# Patient Record
Sex: MALE | Race: WHITE | NOT HISPANIC OR LATINO | Employment: UNEMPLOYED | URBAN - METROPOLITAN AREA
[De-identification: names, ages, dates, MRNs, and addresses within clinical notes are randomized per-mention and may not be internally consistent; named-entity substitution may affect disease eponyms.]

---

## 2023-07-30 ENCOUNTER — HOSPITAL ENCOUNTER (EMERGENCY)
Facility: CLINIC | Age: 23
Discharge: HOME | End: 2023-07-31
Attending: EMERGENCY MEDICINE | Admitting: EMERGENCY MEDICINE
Payer: COMMERCIAL

## 2023-07-30 VITALS
BODY MASS INDEX: 24.38 KG/M2 | WEIGHT: 190 LBS | HEIGHT: 74 IN | RESPIRATION RATE: 16 BRPM | SYSTOLIC BLOOD PRESSURE: 136 MMHG | OXYGEN SATURATION: 98 % | HEART RATE: 86 BPM | TEMPERATURE: 98.7 F | DIASTOLIC BLOOD PRESSURE: 88 MMHG

## 2023-07-30 DIAGNOSIS — R45.851 SUICIDAL IDEATION: ICD-10-CM

## 2023-07-30 DIAGNOSIS — F10.920 ALCOHOLIC INTOXICATION WITHOUT COMPLICATION (H): ICD-10-CM

## 2023-07-30 PROCEDURE — 99285 EMERGENCY DEPT VISIT HI MDM: CPT | Mod: 25

## 2023-07-30 ASSESSMENT — ACTIVITIES OF DAILY LIVING (ADL): ADLS_ACUITY_SCORE: 35

## 2023-07-31 PROBLEM — F41.9 ANXIETY: Status: ACTIVE | Noted: 2023-07-31

## 2023-07-31 PROBLEM — R45.851 SUICIDAL IDEATION: Status: ACTIVE | Noted: 2023-07-31

## 2023-07-31 PROBLEM — F33.1 MAJOR DEPRESSIVE DISORDER, RECURRENT EPISODE, MODERATE (H): Status: ACTIVE | Noted: 2023-07-31

## 2023-07-31 PROBLEM — F10.920 ALCOHOL INTOXICATION, UNCOMPLICATED (H): Status: ACTIVE | Noted: 2023-07-31

## 2023-07-31 LAB — ALCOHOL BREATH TEST: 0.08 (ref 0–0.01)

## 2023-07-31 PROCEDURE — 90791 PSYCH DIAGNOSTIC EVALUATION: CPT

## 2023-07-31 ASSESSMENT — ACTIVITIES OF DAILY LIVING (ADL)
ADLS_ACUITY_SCORE: 35

## 2023-07-31 NOTE — ED PROVIDER NOTES
"  History     Chief Complaint:  Alcohol Intoxication and Suicidal       HPI   Gael Ramirez is a 23 year old male who presents with alcohol intoxication and suicidal ideation. The patient presents from the bar at [facility] after his flight home to [place] was cancelled. He explains that he drank too much and told police at the bar that he wanted to kill himself. He notes a history of depression and suicidal ideation without a plan for the last 2 to 3 years. The patient states that he has never tried to hurt himself because he does not want to disappoint or worry his mother and others who care about him. Gael has no therapist and has not taken Lexapro for an extended period of time. The patient admits to daily marijuana use.     Independent Historian:   None - Patient Only    Review of External Notes:   None       Medications:    The patient is not currently taking any prescribed medications.      Past Medical History:    Depression    Physical Exam   Patient Vitals for the past 24 hrs:   BP Temp Temp src Pulse Resp SpO2 Height Weight   07/30/23 2253 136/88 98.7  F (37.1  C) Temporal 86 16 98 % 1.88 m (6' 2\") 86.2 kg (190 lb)        Physical Exam  General: Sitting up in bed  Eyes:  The pupils are equal and round    Conjunctivae and sclerae are normal  ENT:    Atraumatic face  Neck:  Normal range of motion  CV:  Regular rate     Skin warm and well perfused   Resp:  Non labored breathing on room air    No tachypnea    No cough heard  MS:  Normal muscular tone  Skin:  No rash or acute skin lesions noted  Neuro:   Awake, alert.      Speech is normal and fluent.    Face is symmetric.     Moves all extremities equally  Psych: Normal affect.  Appropriate interactions.    Emergency Department Course       Emergency Department Course & Assessments:    Assessments:  2309 I obtained history and examined the patient as noted above    Independent Interpretation (X-rays, CTs, rhythm " strip):  None    Consultations/Discussion of Management or Tests:  None        Social Determinants of Health affecting care:   None    Disposition:  The patient was discharged to home.     Impression & Plan      Medical Decision Making:  Gael Ramirez is a 23-year-old male who presented to the emergency department with alcohol intoxication and suicidal ideation.  He reports that he has a history of becoming intoxicated and then having suicidal ideation.  This is not the first time he has had suicidal ideation but he reports he would not harm himself and has not harmed himself in the past because of people that love him.  He is hoping to work remotely on his computer this morning and is forward thinking.  He started drinking because he became upset when his flight was canceled for the third time today.  Patient remained to the emergency department until clinically sober and then evaluated by DEC.  DEC obtained collateral information by family and recommends discharge home.    Diagnosis:    ICD-10-CM    1. Alcoholic intoxication without complication (H)  F10.920       2. Suicidal ideation  R45.851          Scribe Disclosure:  I, Yossi Baca, am serving as a scribe at 10:56 PM on 7/30/2023 to document services personally performed by Yola Noel MD based on my observations and the provider's statements to me.   7/30/2023   Yola Noel MD Goertz, Maria Kristine, MD  07/31/23 0607

## 2023-07-31 NOTE — ED TRIAGE NOTES
Patient arrived via ems from airport c/o alcohol intoxication and S.I. Patient lives in [place]. Patient is upset because his flight was cancelled. Patient called PD states he is drunk and some suicidal comments. BG is 90 per ems.     Triage Assessment       Row Name 07/30/23 8022       Triage Assessment (Adult)    Airway WDL WDL       Respiratory WDL    Respiratory WDL WDL       Skin Circulation/Temperature WDL    Skin Circulation/Temperature WDL WDL       Cardiac WDL    Cardiac WDL WDL       Peripheral/Neurovascular WDL    Peripheral Neurovascular WDL WDL       Cognitive/Neuro/Behavioral WDL    Cognitive/Neuro/Behavioral WDL X;level of consciousness

## 2023-07-31 NOTE — CONSULTS
"Diagnostic Evaluation Consultation  Crisis Assessment    Patient Name: Gael Ramirez  Age:  23 year old  Legal Sex: male  Gender Identity: male  Pronouns:   Race: White  Ethnicity: Not  or   Language: English      Patient was assessed: In person      Patient location: [facility] EMERGENCY DEPT                             BH3    Referral Data and Chief Complaint  Gael Ramirez presents to the ED via EMS. Patient is presenting to the ED for the following concerns: Intoxication, Suicidal ideation.   Factors that make the mental health crisis life threatening or complex are:  The pt arrived via EMS / PD after calling 911 at the [facility] intoxicated, stating he was suicidal. Pt reported he had an extremely stressful weekend flying from [place] to [place] for his mother's wedding, complications w/ a rental car, and his returning flight getting rescheduled three times before it was rescheduled for today at 5AM. Pt reported he \"had a drink to celebrate his mom's wedding\" and reported he \"had some more\" after finding out about the cancelation of the flight. Pt reported he \"had a full mental breakdown\" and called his mom, close friend, and 911 stating he had too much to drink, lost his suitcase, and was suicidal. Pt denied SI / HI at this time. Noted that writer attempted to meet w/ pt initially at around 2AM, however pt was confused and likely still intoxicated. Collateral was obtained and assessment was fully completed at 5:30AM.    Informed Consent and Assessment Methods  Explained the crisis assessment process, including applicable information disclosures and limits to confidentiality, assessed understanding of the process, and obtained consent to proceed with the assessment.  Assessment methods included conducting a formal interview with patient, review of medical records, collaboration with medical staff, and obtaining relevant collateral information from family and community " "providers when available.  : done     Patient response to interventions: eager to participate, acceptance expressed  Coping skills were attempted to reduce the crisis:  Called 911 in crisis     History of the Crisis   Pt reported he has had passive suicidal thoughts since the age of 15, and he has just \"dealt with them\". Pt reported he has never attempted suicide before and reported feeling he never will.    Brief Psychosocial History  Family:  Single, Children no  Support System:  Sibling(s), Parent(s)  Employment Status:  employed full-time (Works for [email or web address])  Source of Income:  salary/wages  Financial Environmental Concerns:  No concerns identified  Current Hobbies:  exercise/fitness, social media/computer activities, outdoor activities  Barriers in Personal Life:       Significant Clinical History  Current Anxiety Symptoms:     Current Depression/Trauma:  excessive guilt, withdrawl/isolation, low self esteem  Current Somatic Symptoms:  somatic symptoms (abdominal pain, headache, tension)  Current Psychosis/Thought Disturbance:     Current Eating Symptoms:     Chemical Use History:  Alcohol: Social (Reported he rarely blacks out from alcohol, however reported feeling he blacked out yesterday)  Benzodiazepines: None  Opiates: None  Cocaine: None  Marijuana: Occasional, Daily  Other Use: None   Past diagnosis:  Depression, Anxiety Disorder  Family history:     Past treatment:  Individual therapy, Primary Care, Psychiatric Medication Management  Details of most recent treatment:  Pt reported he sought out help for mental health 3 years ago. He reported he tried therapy through [email or web address], however they went out of business, so he tried an in-person therapist when living in [place] last year, however reported that provider cancelled on him 3x. Pt reported he last saw a medication provider 3 years ago and tried two medications of which he did not like. Pt reported he sees a PCP.  Other relevant history:       Collateral " "Information  Is there collateral information: Yes     Collateral information name, relationship, phone number:  Mother, Heike: [phone]    What happened today: \"He's had a rough last few days. He was supposed to get a rental car upon landing in [facility] [facility] from [place], and they didn't have any, so he waited, and had to spend $750 for one day on one last-minute. Then the wedding happened, and he got there 5 minutes before it started because of all that, and things went great and he went to the airport today, and his flight got delayed three times before it got rescheduled for tomorrow. He had a few drinks here and there, and next thing you know, he drank too much, called me intoxicated and said he was lost at the airport and panicking because he lost his luggage too. He's always struggled with depression and anxiety. I think this may have been one thing he experienced where it was all at once\".     What is different about patient's functioning: \"Doesn't usually drink very much or at all, more stressed / anxious because of the weekend\".     Concern about alcohol/drug use: no    What do you think the patient needs:      Has patient made comments about wanting to kill themselves/others: no    If d/c is recommended, can they take part in safety/aftercare planning:  yes    Additional collateral information:  I will be at a hotel nearby and you can call me when he's ready to discharge: [phone].     Risk Assessment  [place] Suicide Severity Rating Scale Full Clinical Version:  Suicidal Ideation  Q1 Wish to be Dead (Lifetime): Yes  Q2 Non-Specific Active Suicidal Thoughts (Lifetime): Yes  3. Active Suicidal Ideation with any Methods (Not Plan) Without Intent to Act (Lifetime): Yes  Q4 Active Suicidal Ideation with Some Intent to Act, Without Specific Plan (Lifetime): Yes  Q5 Active Suicidal Ideation with Specific Plan and Intent (Lifetime): Yes  Q6 Suicide Behavior (Lifetime): no     Suicidal Behavior (Lifetime)  Actual " Attempt (Lifetime): No  Has subject engaged in non-suicidal self-injurious behavior? (Lifetime): No  Interrupted Attempts (Lifetime): No  Aborted or Self-Interrupted Attempt (Lifetime): No  Preparatory Acts or Behavior (Lifetime): No    [place] Suicide Severity Rating Scale Recent:   Suicidal Ideation (Recent)  Q1 Wished to be Dead (Past Month): yes  Q2 Suicidal Thoughts (Past Month): yes  Q3 Suicidal Thought Method: yes  Q4 Suicidal Intent without Specific Plan: yes  Q5 Suicide Intent with Specific Plan: yes  Level of Risk per Screen: high risk  Intensity of Ideation (Recent)  Most Severe Ideation Rating (Past 1 Month): 3  Description of Most Severe Ideation (Past 1 Month): Thoughts of jumping off bridge, running into traffic, etc. daily passive thoughts.  Frequency (Past 1 Month): 2-5 times in week  Duration (Past 1 Month): Fleeting, few seconds or minutes  Controllability (Past 1 Month): Easily able to control thoughts  Deterrents (Past 1 Month): Deterrents definitely stopped you from attempting suicide  Reasons for Ideation (Past 1 Month): Completely to get attention, revenge, or a reaction from others  Suicidal Behavior (Recent)  Actual Attempt (Past 3 Months): No  Has subject engaged in non-suicidal self-injurious behavior? (Past 3 Months): No  Interrupted Attempts (Past 3 Months): No  Aborted or Self-Interrupted Attempt (Past 3 Months): No  Preparatory Acts or Behavior (Past 3 Months): No    Environmental or Psychosocial Events: loss of a relationship due to divorce/separation, social isolation  Protective Factors: Protective Factors: strong bond to family unit, community support, or employment, responsibilities and duties to others, including pets and children, able to access care without barriers, sense of importance of health and wellness, good treatment engagement, help seeking (Noted his family / mother especially as being his protective factors.)    Does the patient have thoughts of harming others?  "Feels Like Hurting Others: no  Previous Attempt to Hurt Others: no  Is the patient engaging in sexually inappropriate behavior?: no    Is the patient engaging in sexually inappropriate behavior?  no        Mental Status Exam   Affect: Appropriate  Appearance: Appropriate  Attention Span/Concentration: Attentive  Eye Contact: Engaged    Fund of Knowledge: Appropriate   Language /Speech Content: Fluent  Language /Speech Volume: Normal  Language /Speech Rate/Productions: Normal  Recent Memory: Intact  Remote Memory: Intact  Mood: Sad (Feels guilty because of \"what he did, the money it all costs, and the people he stressed out\")  Orientation to Person: Yes   Orientation to Place: Yes  Orientation to Time of Day: Yes  Orientation to Date: Yes     Situation (Do they understand why they are here?): Yes  Psychomotor Behavior: Normal  Thought Content: Clear  Thought Form: Intact     Mini-Cog Assessment  Number of Words Recalled:    Clock-Drawing Test:     Three Item Recall:    Mini-Cog Total Score:       Medication  Psychotropic medications:   Medication Orders - Psychiatric (From admission, onward)      None             Current Care Team  Patient Care Team:  No Ref-Primary, Physician as PCP - General    Diagnosis  Patient Active Problem List   Diagnosis Code    Major depressive disorder, recurrent episode, moderate (H) F33.1    Anxiety F41.9    Alcohol intoxication, uncomplicated (H) F10.920    Suicidal ideation R45.851       Primary Problem This Admission  Active Hospital Problems    Major depressive disorder, recurrent episode, moderate (H)      Anxiety      Alcohol intoxication, uncomplicated (H)      Suicidal ideation      Clinical Summary and Substantiation of Recommendations   After therapeutic assessment, intervention and aftercare planning by ED care team and LM and in consultation with attending provider, the patient's circumstances and mental state were appropriate for outpatient management. It is the " recommendation of this clinician that pt discharge with OP MH support. A this time the pt is not presenting as an acute risk to self or others due to the following factors: Denying SI / HI, is clinically sober and able to discharge.    Writer explained recommendation of individual therapy and psychiatry (resources) for places in [place]. Pt reported that due to his experience, he respectfully declines.    Patient coping skills attempted to reduce the crisis:  Called 911 in crisis    Disposition  Recommended disposition: Individual Therapy, Medication Management        Reviewed case and recommendations with attending provider. Attending Name: Dr. Noel       Attending concurs with disposition: yes       Patient and/or validated legal guardian concurs with disposition:   yes       Final disposition: discharge w/ mother    Assessment Details   Total duration spent on the patient case in minutes: 45 min     CPT code(s) utilized: 12259 - Psychotherapy for Crisis - 60 (30-74*) min    KRISTEL Garces, Psychotherapist  DEC - Triage & Transition Services  Callback: [phone]

## 2023-07-31 NOTE — DISCHARGE INSTRUCTIONS
Aftercare Plan    If I am feeling unsafe or I am in a crisis, I will:     Call the National Suicide Prevention Lifeline (050) or the crisis text line (176140).   Go to the nearest emergency room.   Call 500.     Warning signs that I or other people might notice when a crisis is developing for me: Having plans, means, and/or intent to hurt or kill myself    Things I am able to do on my own to cope or help me feel better:   -Take a deep breath and sit down if needed. Think before acting.   -I can try practicing square breathing when I begin to feel anxious - inhale through the nose for the count of 4 and the first line on the square. Exhale through the mouth for the count of 4 for the second line of the square. Repeat to complete the square. Repeat the square as many times as needed.  -I can also use my five senses to practice mindfulness and grounding. What are five things I can see, four things I can hear, three things I can feel, two things I can smell, and one thing I can taste.  -Download a meditation estuardo and spend 15-20 minutes per day mediating/relaxing. Some apps to download include Calm, Headspace, and Insight Timer. All of these apps have free version.     Things that I am able to do with others to cope or help me better:   -Commit to 30 minutes of self care daily. This can be as simple as taking a shower, going for a walk, cooking a meal, reading, writing, etc.   -I can also use community resources including mental health hotlines, [facility] crisis teams, or apps.     Things I can use or do for distraction:   -Call a friend or family member.   -Spend time outside.   -Go for a walk.   -Exercise  -Do chores.   -Do a project or favorite activity.   -Listen to music.   -Read.   -Journal your feelings.   -I can also download a meditation or relaxation estuardo, like Calm, Headspace, or Insight Timer (all three offer a free version).   -A great website resource is Change to Chill with active coping skills.     Changes I  "can make to support my mental health and wellness:   -Get at least 6-8 hours of sleep each night.   -Eat 3 nutritious meals per day.   -Take all of your medications as prescribed.   -Attend scheduled mental health therapy and psychiatric appointments and follow all recommendations.   -Maintain a daily schedule/routine.   -Practice deep breathing skills.   -Refrain from taking mood altering chemicals not currently prescribed to me.     People in my life that I can ask for help: My family, my friends     Your [facility] has a mental health crisis team you can call 24/7: [facility] Crisis  [phone]     Other things that are important when I'm in crisis: Remember that the feelings I am having right now are temporary and it won't feel like this forever. It is okay and important to ask for help.     Crisis Lines  Crisis Text Line  Text 867318  You will be connected with a trained live crisis counselor to provide support.  Alice kennyanol, texto  DARSHAN a 797642 o texto a 442-AYUDAME en WhatsApp  The [employer] Project (LGBTQ Youth Crisis Line)  5.907.355.0565  text START to 716-569    QuantHouse  Fast Tracker  Linking people to mental health and substance use disorder resources  fasttrackyuilop SLn.org   [place] Mental [facility] Warm Line  Peer to peer support  Monday thru Saturday, 12 pm to 10 pm  729.960.0542 or 0.136.034.3060  Text \"Support\" to 75610  National [place] on Mental Illness (LARISSA)  533.173.0785 or 1.888.LARISSA.HELPS    Mental Health Apps (all of these apps have a free version)  My3  https://my3app.org/  VirtualHopeBox  https://H?REL.org/apps/virtual-hope-box/  Calm  Headspace  Insight Timer  Calm Harm    Crisis Lines  Crisis Text Line  Text 572982  You will be connected with a trained live crisis counselor to provide support.  Por espanol, texto  DARSHAN a 795015 o texto a 442-AYUDAME en WhatsApp  National Hope Line  1.800.SUICIDE [7846490]    Community Resources  Fast Tracker  " "Linking people to mental health and substance use disorder resources  fastTheFix.comckLooglan.Ubequity   [place] Mental Health Warm Line  Peer to peer support  Monday thru Saturday, 12 pm to 10 pm  591.008.2907 or 0.430.620.8280  Text \"Support\" to 52022  National [place] on Mental Illness (LARISSA)  982.688.2098 or 1888.LARISSA.HELPS    Additional Information  Today you were seen by a licensed mental health professional through Triage and Transition services, Behavioral [facility] Providers ([facility])  for a crisis assessment in the Emergency Department at [facility].  It is recommended that you follow up with your established providers (psychiatrist, mental health therapist, and/or primary care doctor - as relevant) as soon as possible. Coordinators from [facility] will be calling you in the next 24-48 hours to ensure that you have the resources you need.  You can also contact [facility] coordinators directly at [phone]. You may have been scheduled for or offered an appointment with a mental health provider. [facility] maintains an extensive network of licensed behavioral health providers to connect patients with the services they need.  We do not charge providers a fee to participate in our referral network.  We match patients with providers based on a patient's specific needs, insurance coverage, and location.  Our first effort will be to refer you to a provider within your care system, and will utilize providers outside your care system as needed.     "

## 2023-07-31 NOTE — ED NOTES
Bed: BH3  Expected date: 7/30/23  Expected time: 10:40 PM  Means of arrival: Ambulance  Comments:  Nitza [age]M intox;suicidal

## 2023-10-07 ENCOUNTER — HEALTH MAINTENANCE LETTER (OUTPATIENT)
Age: 23
End: 2023-10-07

## 2024-11-30 ENCOUNTER — HEALTH MAINTENANCE LETTER (OUTPATIENT)
Age: 24
End: 2024-11-30